# Patient Record
(demographics unavailable — no encounter records)

---

## 2024-10-22 NOTE — REASON FOR VISIT
[Mother] : mother [Subsequent Evaluation] : a subsequent evaluation for [FreeTextEntry2] : retropharyngeal abscess

## 2024-10-22 NOTE — HISTORY OF PRESENT ILLNESS
[No Personal or Family History of Easy Bruising, Bleeding, or Issues with General Anesthesia] : No Personal or Family History of easy bruising, bleeding, or issues with general anesthesia [de-identified] : Romeo is a 17 month old boy seen for follow up after retropharyngeal abscess.  He is here with mom who provides history.   Romeo [place] [facility] [place] for retropharyngeal abscess measuring 1.8 x 1.1 x 1 cm. He improved with IV antibiotics and was discharged on oral antibiotics.  He has now completed his course.  He is back to baseline. No fevers or chills. No sore throat. No ear infections.  No other otolaryngology concerns.

## 2024-10-22 NOTE — PHYSICAL EXAM
[Exposed Vessel] : left anterior vessel not exposed [2+] : 2+ [Increased Work of Breathing] : no increased work of breathing with use of accessory muscles and retractions [Normal Gait and Station] : normal gait and station [Normal muscle strength, symmetry and tone of facial, head and neck musculature] : normal muscle strength, symmetry and tone of facial, head and neck musculature [Normal] : no cervical lymphadenopathy [de-identified] : no retrophayrngeal fullness [de-identified] : full neck range of motion in all directions

## 2024-10-22 NOTE — ASSESSMENT
[FreeTextEntry1] : Seen for follow up after hospitalization for retropharyngeal abscess treated with antibiotics.  Now s/p course of oral antibiotics. Has recovered well with no additional sequela.  Follow up with ENT as needed.